# Patient Record
Sex: FEMALE | NOT HISPANIC OR LATINO | URBAN - METROPOLITAN AREA
[De-identification: names, ages, dates, MRNs, and addresses within clinical notes are randomized per-mention and may not be internally consistent; named-entity substitution may affect disease eponyms.]

---

## 2017-08-04 ENCOUNTER — IMPORTED ENCOUNTER (OUTPATIENT)
Dept: URBAN - METROPOLITAN AREA CLINIC 1 | Facility: CLINIC | Age: 82
End: 2017-08-04

## 2017-08-04 PROBLEM — H16.143: Noted: 2017-08-04

## 2017-08-04 PROBLEM — H01.001: Noted: 2017-08-04

## 2017-08-04 PROBLEM — H04.123: Noted: 2017-08-04

## 2017-08-04 PROBLEM — H25.813: Noted: 2017-08-04

## 2017-08-04 PROBLEM — H01.004: Noted: 2017-08-04

## 2017-08-04 PROCEDURE — 92004 COMPRE OPH EXAM NEW PT 1/>: CPT

## 2017-08-04 PROCEDURE — 83861 MICROFLUID ANALY TEARS: CPT

## 2017-08-04 PROCEDURE — 92015 DETERMINE REFRACTIVE STATE: CPT

## 2017-08-04 NOTE — PATIENT DISCUSSION
1.  LIBIA w/ PEK OU- Mild tear lab results /308. Recommend ATs QID OU (sample of Systane Balance given) 2. Cataract OU: Observe for now without intervention. The patient was advised to contact us if any change or worsening of vision3. Blepharitis anterior type OU - Daily warm compresses and lid scrubs were recommended. MRX for glasses givenReturn for an appointment in 6 months 10/DFE with Dr. Ana Mejia.

## 2018-02-07 ENCOUNTER — IMPORTED ENCOUNTER (OUTPATIENT)
Dept: URBAN - METROPOLITAN AREA CLINIC 1 | Facility: CLINIC | Age: 83
End: 2018-02-07

## 2018-02-07 PROBLEM — H16.143: Noted: 2018-02-07

## 2018-02-07 PROBLEM — H01.004: Noted: 2018-02-07

## 2018-02-07 PROBLEM — H01.001: Noted: 2018-02-07

## 2018-02-07 PROBLEM — H25.813: Noted: 2018-02-07

## 2018-02-07 PROBLEM — H04.123: Noted: 2018-02-07

## 2018-02-07 PROCEDURE — 92012 INTRM OPH EXAM EST PATIENT: CPT

## 2018-02-07 NOTE — PATIENT DISCUSSION
1.  LIBIA w/ PEK OU- Recommend ATs BID-TID OU routinely 2. Cataract OU: Observe for now without intervention. The patient was advised to contact us if any change or worsening of vision3. Anterior Blepharitis OU - Daily warm compresses and lid scrubs were recommended. Return for an appointment in 6 months 27 with Dr. Karo Mayo.

## 2018-08-08 ENCOUNTER — IMPORTED ENCOUNTER (OUTPATIENT)
Dept: URBAN - METROPOLITAN AREA CLINIC 1 | Facility: CLINIC | Age: 83
End: 2018-08-08

## 2018-08-08 PROBLEM — H01.001: Noted: 2018-08-08

## 2018-08-08 PROBLEM — H04.123: Noted: 2018-08-08

## 2018-08-08 PROBLEM — H16.143: Noted: 2018-08-08

## 2018-08-08 PROBLEM — H01.005: Noted: 2018-08-08

## 2018-08-08 PROBLEM — H01.004: Noted: 2018-08-08

## 2018-08-08 PROBLEM — H25.813: Noted: 2018-08-08

## 2018-08-08 PROBLEM — H01.002: Noted: 2018-08-08

## 2018-08-08 PROCEDURE — 92014 COMPRE OPH EXAM EST PT 1/>: CPT

## 2018-08-08 PROCEDURE — 92015 DETERMINE REFRACTIVE STATE: CPT

## 2018-08-08 NOTE — PATIENT DISCUSSION
1.  Cataracts OU -- Observe for now without intervention. The patient was advised to contact us if any change or worsening of vision. 2. LIBIA w/ PEK OU -- Tear Lab Results: OD- 310 / OS- 308 (8/4/17). Recommend the frequent use of OTC AT's BID-QID OU. 3. Anterior Blepharitis OU -- Recommend daily hot / warm moist compresses and lid scrubs QHS / PRN OU. Finalized Glasses MRx & given to patient today. Return for an appointment in 6 MO for a  / [facility] / Cesia Butler / x OU with Dr. Mary Ocampo.

## 2019-02-06 ENCOUNTER — IMPORTED ENCOUNTER (OUTPATIENT)
Dept: URBAN - METROPOLITAN AREA CLINIC 1 | Facility: CLINIC | Age: 84
End: 2019-02-06

## 2019-02-06 PROBLEM — H25.813: Noted: 2019-02-06

## 2019-02-06 PROBLEM — H01.025: Noted: 2019-02-06

## 2019-02-06 PROBLEM — H01.005: Noted: 2019-02-06

## 2019-02-06 PROBLEM — H01.024: Noted: 2019-02-06

## 2019-02-06 PROBLEM — H01.002: Noted: 2019-02-06

## 2019-02-06 PROBLEM — H01.004: Noted: 2019-02-06

## 2019-02-06 PROBLEM — H01.022: Noted: 2019-02-06

## 2019-02-06 PROBLEM — H01.021: Noted: 2019-02-06

## 2019-02-06 PROBLEM — H04.123: Noted: 2019-02-06

## 2019-02-06 PROBLEM — H16.143: Noted: 2019-02-06

## 2019-02-06 PROBLEM — H01.001: Noted: 2019-02-06

## 2019-02-06 PROCEDURE — 92012 INTRM OPH EXAM EST PATIENT: CPT

## 2019-02-06 PROCEDURE — 92015 DETERMINE REFRACTIVE STATE: CPT

## 2019-02-06 NOTE — PATIENT DISCUSSION
1.  Cataracts OU -- Observe for now without intervention. The patient was advised to contact us if any change or worsening of vision. 2. LIBIA w/ PEK OU -- Tear Lab Results: OD- 310 / OS- 308 (8/4/17). Recommend continue the frequent use of OTC AT's BID-QID OU Routinely. 3. Blepharitis OU -- Recommend Hot Moist Compresses and Lid Scrubs / Baby Shampoo QHS / PRN OU. Finalized Glasses MRx was given to patient today. Return for an appointment in 6 MO for a [address] [place] [address] with Dr. Jaz Hoover.

## 2022-04-02 ASSESSMENT — VISUAL ACUITY
OD_GLARE: 20/150
OD_GLARE: 20/150
OS_GLARE: 20/150
OS_GLARE: 20/150
OD_SC: 20/30
OD_CC: J1
OS_SC: 20/30
OD_SC: 20/40
OS_SC: 20/30
OS_SC: 20/40
OS_CC: J1
OS_CC: J2
OS_GLARE: 20/150
OS_SC: 20/30
OD_GLARE: 20/150
OS_CC: J2
OD_SC: 20/30
OS_CC: J2
OD_CC: J2
OS_GLARE: 20/100
OD_CC: J2
OD_CC: J2
OD_GLARE: 20/150
OD_SC: 20/40

## 2022-04-02 ASSESSMENT — TONOMETRY
OD_IOP_MMHG: 14
OD_IOP_MMHG: 13
OS_IOP_MMHG: 14
OS_IOP_MMHG: 13
OD_IOP_MMHG: 13
OD_IOP_MMHG: 14
OS_IOP_MMHG: 14
OS_IOP_MMHG: 14

## 2022-04-02 ASSESSMENT — KERATOMETRY
OD_AXISANGLE_DEGREES: 021
OD_AXISANGLE2_DEGREES: 111
OD_K2POWER_DIOPTERS: 42.50
OS_K2POWER_DIOPTERS: 42.75
OS_AXISANGLE2_DEGREES: 092
OS_K1POWER_DIOPTERS: 44.00
OS_AXISANGLE_DEGREES: 002
OD_K1POWER_DIOPTERS: 43.50

## 2023-02-01 RX ORDER — LORAZEPAM 2 MG/1
2 TABLET ORAL
COMMUNITY

## 2023-02-01 RX ORDER — AMLODIPINE BESYLATE 5 MG/1
5 TABLET ORAL DAILY
COMMUNITY

## 2023-02-01 RX ORDER — METOPROLOL TARTRATE 50 MG/1
50 TABLET, FILM COATED ORAL
COMMUNITY

## 2023-02-01 RX ORDER — ROSUVASTATIN CALCIUM 10 MG/1
10 TABLET, COATED ORAL DAILY
COMMUNITY